# Patient Record
Sex: FEMALE | Race: WHITE | Employment: FULL TIME | URBAN - NONMETROPOLITAN AREA
[De-identification: names, ages, dates, MRNs, and addresses within clinical notes are randomized per-mention and may not be internally consistent; named-entity substitution may affect disease eponyms.]

---

## 2022-04-28 ENCOUNTER — HOSPITAL ENCOUNTER (EMERGENCY)
Age: 37
Discharge: HOME | End: 2022-04-28
Attending: FAMILY MEDICINE

## 2022-04-28 VITALS
RESPIRATION RATE: 18 BRPM | TEMPERATURE: 99.1 F | WEIGHT: 293 LBS | OXYGEN SATURATION: 100 % | BODY MASS INDEX: 43.4 KG/M2 | HEART RATE: 115 BPM | DIASTOLIC BLOOD PRESSURE: 117 MMHG | HEIGHT: 69 IN | SYSTOLIC BLOOD PRESSURE: 155 MMHG

## 2022-04-28 DIAGNOSIS — R45.851 SUICIDAL IDEATION: Primary | ICD-10-CM

## 2022-04-28 LAB
AMPHETAMINE+METHAMPHETAMINE URINE SCREEN: NEGATIVE
ANION GAP SERPL CALCULATED.3IONS-SCNC: 11 MEQ/L (ref 8–16)
BACTERIA: ABNORMAL /HPF
BARBITURATE QUANTITATIVE URINE: NEGATIVE
BASOPHILS # BLD: 0.6 %
BASOPHILS ABSOLUTE: 0 THOU/MM3 (ref 0–0.1)
BENZODIAZEPINE QUANTITATIVE URINE: NEGATIVE
BILIRUBIN URINE: NEGATIVE
BLOOD, URINE: ABNORMAL
BUN BLDV-MCNC: 14 MG/DL (ref 7–22)
CALCIUM SERPL-MCNC: 8.8 MG/DL (ref 8.5–10.5)
CANNABINOID QUANTITATIVE URINE: NEGATIVE
CASTS 2: ABNORMAL /LPF
CASTS UA: ABNORMAL /LPF
CHARACTER, URINE: CLEAR
CHLORIDE BLD-SCNC: 101 MEQ/L (ref 98–111)
CO2: 24 MEQ/L (ref 23–33)
COCAINE METABOLITE QUANTITATIVE URINE: NEGATIVE
COLOR: YELLOW
CREAT SERPL-MCNC: 0.7 MG/DL (ref 0.4–1.2)
CRYSTALS, UA: ABNORMAL
EOSINOPHIL # BLD: 2.1 %
EOSINOPHILS ABSOLUTE: 0.2 THOU/MM3 (ref 0–0.4)
EPITHELIAL CELLS, UA: ABNORMAL /HPF
ERYTHROCYTE [DISTWIDTH] IN BLOOD BY AUTOMATED COUNT: 13.4 % (ref 11.5–14.5)
ERYTHROCYTE [DISTWIDTH] IN BLOOD BY AUTOMATED COUNT: 44.2 FL (ref 35–45)
ETHYL ALCOHOL, SERUM: < 0.01 %
GFR SERPL CREATININE-BSD FRML MDRD: > 90 ML/MIN/1.73M2
GLUCOSE BLD-MCNC: 104 MG/DL (ref 70–108)
GLUCOSE URINE: NEGATIVE MG/DL
HCT VFR BLD CALC: 44.1 % (ref 37–47)
HEMOGLOBIN: 13.9 GM/DL (ref 12–16)
IMMATURE GRANS (ABS): 0.02 THOU/MM3 (ref 0–0.07)
IMMATURE GRANULOCYTES: 0.2 %
KETONES, URINE: NEGATIVE
LEUKOCYTE ESTERASE, URINE: ABNORMAL
LYMPHOCYTES # BLD: 21.6 %
LYMPHOCYTES ABSOLUTE: 1.8 THOU/MM3 (ref 1–4.8)
MCH RBC QN AUTO: 28.5 PG (ref 26–33)
MCHC RBC AUTO-ENTMCNC: 31.5 GM/DL (ref 32.2–35.5)
MCV RBC AUTO: 90.4 FL (ref 81–99)
MISCELLANEOUS 2: ABNORMAL
MONOCYTES # BLD: 4.8 %
MONOCYTES ABSOLUTE: 0.4 THOU/MM3 (ref 0.4–1.3)
NITRITE, URINE: NEGATIVE
NUCLEATED RED BLOOD CELLS: 0 /100 WBC
OPIATES, URINE: NEGATIVE
OSMOLALITY CALCULATION: 272.7 MOSMOL/KG (ref 275–300)
OXYCODONE: NEGATIVE
PH UA: 5 (ref 5–9)
PHENCYCLIDINE QUANTITATIVE URINE: NEGATIVE
PLATELET # BLD: 392 THOU/MM3 (ref 130–400)
PMV BLD AUTO: 9.2 FL (ref 9.4–12.4)
POTASSIUM REFLEX MAGNESIUM: 4.4 MEQ/L (ref 3.5–5.2)
PREGNANCY, URINE: NEGATIVE
PROTEIN UA: ABNORMAL
RBC # BLD: 4.88 MILL/MM3 (ref 4.2–5.4)
RBC URINE: ABNORMAL /HPF
RENAL EPITHELIAL, UA: ABNORMAL
SEG NEUTROPHILS: 70.7 %
SEGMENTED NEUTROPHILS ABSOLUTE COUNT: 5.8 THOU/MM3 (ref 1.8–7.7)
SODIUM BLD-SCNC: 136 MEQ/L (ref 135–145)
SPECIFIC GRAVITY, URINE: 1.02 (ref 1–1.03)
UROBILINOGEN, URINE: 0.2 EU/DL (ref 0–1)
WBC # BLD: 8.2 THOU/MM3 (ref 4.8–10.8)
WBC UA: ABNORMAL /HPF
YEAST: ABNORMAL

## 2022-04-28 PROCEDURE — 87086 URINE CULTURE/COLONY COUNT: CPT

## 2022-04-28 PROCEDURE — 81025 URINE PREGNANCY TEST: CPT

## 2022-04-28 PROCEDURE — 85025 COMPLETE CBC W/AUTO DIFF WBC: CPT

## 2022-04-28 PROCEDURE — 80307 DRUG TEST PRSMV CHEM ANLYZR: CPT

## 2022-04-28 PROCEDURE — 81001 URINALYSIS AUTO W/SCOPE: CPT

## 2022-04-28 PROCEDURE — 99283 EMERGENCY DEPT VISIT LOW MDM: CPT

## 2022-04-28 PROCEDURE — 82077 ASSAY SPEC XCP UR&BREATH IA: CPT

## 2022-04-28 PROCEDURE — 80048 BASIC METABOLIC PNL TOTAL CA: CPT

## 2022-04-28 PROCEDURE — 36415 COLL VENOUS BLD VENIPUNCTURE: CPT

## 2022-04-28 ASSESSMENT — LIFESTYLE VARIABLES: HOW OFTEN DO YOU HAVE A DRINK CONTAINING ALCOHOL: NEVER

## 2022-04-28 ASSESSMENT — SLEEP AND FATIGUE QUESTIONNAIRES
DO YOU HAVE DIFFICULTY SLEEPING: YES
SLEEP PATTERN: DIFFICULTY FALLING ASLEEP;DISTURBED/INTERRUPTED SLEEP
DO YOU USE A SLEEP AID: YES

## 2022-04-28 ASSESSMENT — PAIN - FUNCTIONAL ASSESSMENT: PAIN_FUNCTIONAL_ASSESSMENT: NONE - DENIES PAIN

## 2022-04-28 NOTE — PROGRESS NOTES
Chief Complaint:    [address]      Provisional Diagnosis: Unspecified Mood Disorder      Risk, Psychosocial and Contextual Factors: Bereavement, no outpatient [address] provider      Current  Treatment: Denies      Present Suicidal Behavior:    Verbal: Denies    Attempt: Denies      Access to Weapons: Denies      C-SSRS Current Suicide Risk: Low, Moderate or High:    No Risk      Past Suicidal Behavior:    Verbal: Denies    Attempts: Denies      Self-Injurious/Self-Mutilation: Denies      Traumatic Event Within Past 2 Weeks: Mother  3 months ago      Current Abuse:  Denies      Legal: Denies      Violence: Denies      Protective Factors:  Communication, reasons to live (2 cats)      Housing: Alone in [facility]      CPAP/Oxygen/Ambulation Difficulties: Denies      Risk Factors: Bereavement, limited support      Clinical Summary:      Patient is a 42year old female who presents to the ED under [address]. Patient reports she is here \"because of some comments I made I guess\". Patient reports she made the statement that \"nothing is keeping me here\". Patient denies any suicidal/homicidal thoughts. Denies mental health history/history of suicide attempt. Patient reports her mother passing away 3 months ago is the reason she feels there is \"nothing keeping her here\". Patient reports her mother had lived with her prior to passing. Patient is employed full time at [employer]. Denies any other supports, patient does not have siblings. Patient does identify her 2 cats as reasons to live. Patient denies hallucinations. No delusions noted. AOD denied. Level of Care Disposition:      0819: Lab in at this time. 1900: Handover to third shift clinician for final disposition.

## 2022-04-28 NOTE — ED PROVIDER NOTES
Transportation (Non-Medical): Not on file   Physical Activity:     Days of Exercise per Week: Not on file    Minutes of Exercise per Session: Not on file   Stress:     Feeling of Stress : Not on file   Social Connections:     Frequency of Communication with Friends and Family: Not on file    Frequency of Social Gatherings with Friends and Family: Not on file    Attends Muslim Services: Not on file    Active Member of [address] [place] [address] or Organizations: Not on file    Attends Club or Organization Meetings: Not on file    Marital Status: Not on file   Intimate Partner Violence:     Fear of Current or Ex-Partner: Not on file    Emotionally Abused: Not on file    Physically Abused: Not on file    Sexually Abused: Not on file   Housing Stability:     Unable to Pay for Housing in the Last Year: Not on file    Number of [place] in the Last Year: Not on file    Unstable Housing in the Last Year: Not on file      History reviewed. No pertinent family history. PHYSICAL EXAM   VITAL SIGNS: BP (!) 155/117   Pulse 115   Temp 99.1 °F (37.3 °C) (Oral)   Resp 18   Ht 5' 9\" (1.753 m)   Wt (!) 350 lb (158.8 kg)   LMP 04/25/2022   SpO2 100%   BMI 51.69 kg/m²    Constitutional: Well developed, well nourished, no acute distress   Eyes: PERRL, conjunctiva normal   HENT: Atraumatic, external ears normal, nose normal   Neck: supple, No JVD   Respiratory: No respiratory distress, normal breath sounds   Cardiovascular: Normal rate, normal rhythm, no murmurs   GI: Soft, nondistended, normal bowel sounds, nontender   Musculoskeletal: No edema, no deformities   Integument: Well hydrated   Neurologic: Awake alert and oriented, no slurred speech, normal gross motor coordination and strength. Normal Gait. CN 3-12 intatct.    Psychiatric: Flat affect, does make good eye contact, denies suicidal thoughts, no psychosis noted; no signs of toxidrome noted     Labs Reviewed   CBC WITH AUTO DIFFERENTIAL - Abnormal; Notable for the following components:       Result Value    MCHC 31.5 (*)     MPV 9.2 (*)     All other components within normal limits   OSMOLALITY - Abnormal; Notable for the following components:    Osmolality Calc 272.7 (*)     All other components within normal limits   URINE WITH REFLEXED MICRO - Abnormal; Notable for the following components:    Blood, Urine LARGE (*)     Protein, UA TRACE (*)     Leukocyte Esterase, Urine SMALL (*)     All other components within normal limits   CULTURE, REFLEXED, URINE   ETHANOL   URINE DRUG SCREEN   PREGNANCY, URINE   BASIC METABOLIC PANEL W/ REFLEX TO MG FOR LOW K   ANION GAP   GLOMERULAR FILTRATION RATE, ESTIMATED         ED COURSE & MEDICAL DECISION MAKING   Pertinent Labs studies reviewed and interpreted. (See chart for details)   Consultation: Mental health Professional consulted in the emergency Department   Differential diagnoses: Metabolic emergency, infection, primary neurologic emergency, psychosis, behavioral psychiatric problem, toxidrome, illicit drug use, other   The patient is medically stable     FINAL IMPRESSION   1. Suicidal ideation         PLAN   MDM - pt verbally endorsed suicidal thoughts without a definite plan. She did have student loans due that are weighing on her. I did not have any verbal confirmation from her that she is actively suicidal, nor did she have prior hx of depression or attempts or psychiatric admission. Re-assesment at 0750pm: pt was medically cleared by me at this time. Social work evaluated pt extensively and discussed with on-call psychiatrist, deemed stable for discharge.     Electronically signed by: Sukhwinder Pham MD, 4/28/2022 8:52 PM     (This note was completed with a voice recognition program)         Jess Brewer MD  04/28/22 2052

## 2022-04-28 NOTE — ED NOTES
Urine sample collected and sent to lab. Pt voicing that she is angry because she needs to get home to give her cat medicine. Pt states she wants to go home. No needs expressed at this time.       Grant Dent RN  04/28/22 1952

## 2022-04-29 NOTE — PROGRESS NOTES
Clinician consulted with Dr. Shantal Padron and pt is to be discharged. Clinician spoke with Dr. Chloe Donahue and advised of Dr. Shantal Padron disposition. Dr. Chloe Donahue agreed and is lifting the [facility].

## 2022-04-30 LAB
ORGANISM: ABNORMAL
URINE CULTURE REFLEX: ABNORMAL